# Patient Record
Sex: MALE | Race: WHITE
[De-identification: names, ages, dates, MRNs, and addresses within clinical notes are randomized per-mention and may not be internally consistent; named-entity substitution may affect disease eponyms.]

---

## 2020-02-17 ENCOUNTER — HOSPITAL ENCOUNTER (OUTPATIENT)
Dept: HOSPITAL 95 - MHTC | Age: 60
Discharge: HOME | End: 2020-02-17
Attending: INTERNAL MEDICINE
Payer: COMMERCIAL

## 2020-02-17 VITALS — WEIGHT: 240.3 LBS | BODY MASS INDEX: 37.72 KG/M2 | HEIGHT: 67 IN

## 2020-02-17 DIAGNOSIS — Z88.8: ICD-10-CM

## 2020-02-17 DIAGNOSIS — I10: ICD-10-CM

## 2020-02-17 DIAGNOSIS — E78.5: ICD-10-CM

## 2020-02-17 DIAGNOSIS — Z79.02: ICD-10-CM

## 2020-02-17 DIAGNOSIS — Z87.891: ICD-10-CM

## 2020-02-17 DIAGNOSIS — E66.9: ICD-10-CM

## 2020-02-17 DIAGNOSIS — Z79.82: ICD-10-CM

## 2020-02-17 DIAGNOSIS — Z79.899: ICD-10-CM

## 2020-02-17 DIAGNOSIS — I69.354: Primary | ICD-10-CM

## 2020-02-17 DIAGNOSIS — I65.23: ICD-10-CM

## 2020-02-17 NOTE — NUR
DISCHARGE INSTRUCTIONS REVIEWED AND ALL QUESTIONS ANSWERED. 19 G IV
DISCONTINUED FROM RIGHT FOREARM WITH INTACT CANNULA. PT DRANK WATER WITH NO
ASPIRATION. PT ESCORTED OUT VIA WHEELCHAIR ESCORT.